# Patient Record
Sex: FEMALE | URBAN - METROPOLITAN AREA
[De-identification: names, ages, dates, MRNs, and addresses within clinical notes are randomized per-mention and may not be internally consistent; named-entity substitution may affect disease eponyms.]

---

## 2018-10-02 ENCOUNTER — APPOINTMENT (RX ONLY)
Dept: URBAN - METROPOLITAN AREA CLINIC 57 | Facility: CLINIC | Age: 43
Setting detail: DERMATOLOGY
End: 2018-10-02

## 2018-10-02 DIAGNOSIS — L50.8 OTHER URTICARIA: ICD-10-CM | Status: INADEQUATELY CONTROLLED

## 2018-10-02 PROCEDURE — ? COUNSELING

## 2018-10-02 PROCEDURE — ? EDUCATIONAL RESOURCES PROVIDED

## 2018-10-02 PROCEDURE — ? TREATMENT REGIMEN

## 2018-10-02 PROCEDURE — ? ORDER TESTS

## 2018-10-02 PROCEDURE — ? PRESCRIPTION

## 2018-10-02 PROCEDURE — 99203 OFFICE O/P NEW LOW 30 MIN: CPT

## 2018-10-02 RX ORDER — TRIAMCINOLONE ACETONIDE 1 MG/G
CREAM TOPICAL
Qty: 1 | Refills: 3 | Status: ERX | COMMUNITY
Start: 2018-10-02

## 2018-10-02 RX ORDER — LEVOCETIRIZINE DIHYDROCHLORIDE 5 MG
TABLET ORAL
Qty: 30 | Refills: 3 | Status: ERX | COMMUNITY
Start: 2018-10-02

## 2018-10-02 RX ADMIN — TRIAMCINOLONE ACETONIDE: 1 CREAM TOPICAL at 20:22

## 2018-10-02 RX ADMIN — Medication: at 20:20

## 2018-10-02 ASSESSMENT — LOCATION DETAILED DESCRIPTION DERM
LOCATION DETAILED: PERIUMBILICAL SKIN
LOCATION DETAILED: LEFT ANTERIOR PROXIMAL THIGH
LOCATION DETAILED: RIGHT ANTERIOR PROXIMAL THIGH

## 2018-10-02 ASSESSMENT — LOCATION SIMPLE DESCRIPTION DERM
LOCATION SIMPLE: ABDOMEN
LOCATION SIMPLE: LEFT THIGH
LOCATION SIMPLE: RIGHT THIGH

## 2018-10-02 ASSESSMENT — LOCATION ZONE DERM
LOCATION ZONE: TRUNK
LOCATION ZONE: LEG

## 2018-10-02 NOTE — PROCEDURE: TREATMENT REGIMEN
Initiate Treatment: xyzal 5mg 1- 2 P.O. qd\\nTAC cream bid prn flares for the rash
Discontinue Regimen: OTC hydrocortisone cream bid prn flares\\nzyrtec 1 P.O. qd
Detail Level: Zone
Plan: First developed rash after first pregnancy in Nov 2017.\\nPatient on immmunotherapy for fire ants for the past 4 years.

## 2018-10-02 NOTE — HPI: RASH
How Severe Is Your Rash?: moderate
Is This A New Presentation, Or A Follow-Up?: Rash
Additional History: The rash comes and goes since first childbirth in November 2017 when she wore the underwear provided by the hospital.The rash comes and goes but resolves overnight when she takes medications. The rash began on her thighs and went away once she stopped wearing the underwear provided by the hospital.When her rash came back it came around in her trunk area about 3 months ago.

## 2018-11-06 ENCOUNTER — APPOINTMENT (RX ONLY)
Dept: URBAN - METROPOLITAN AREA CLINIC 57 | Facility: CLINIC | Age: 43
Setting detail: DERMATOLOGY
End: 2018-11-06

## 2018-11-06 DIAGNOSIS — L50.8 OTHER URTICARIA: ICD-10-CM | Status: IMPROVED

## 2018-11-06 PROCEDURE — 99213 OFFICE O/P EST LOW 20 MIN: CPT

## 2018-11-06 PROCEDURE — ? COUNSELING

## 2018-11-06 PROCEDURE — ? TREATMENT REGIMEN

## 2018-11-06 ASSESSMENT — LOCATION DETAILED DESCRIPTION DERM
LOCATION DETAILED: PERIUMBILICAL SKIN
LOCATION DETAILED: RIGHT ANTERIOR PROXIMAL THIGH
LOCATION DETAILED: LEFT ANTERIOR PROXIMAL THIGH

## 2018-11-06 ASSESSMENT — LOCATION SIMPLE DESCRIPTION DERM
LOCATION SIMPLE: RIGHT THIGH
LOCATION SIMPLE: LEFT THIGH
LOCATION SIMPLE: ABDOMEN

## 2018-11-06 ASSESSMENT — LOCATION ZONE DERM
LOCATION ZONE: LEG
LOCATION ZONE: TRUNK

## 2018-11-06 NOTE — PROCEDURE: TREATMENT REGIMEN
Detail Level: Zone
Continue Regimen: xyzal 5mg 1- 2 P.O. qd\\nTAC cream bid prn flares for the rash
Plan: First developed rash after first pregnancy in Nov 2017.\\nPatient on immmunotherapy for fire ants for the past 4 years.